# Patient Record
Sex: FEMALE | Race: WHITE | NOT HISPANIC OR LATINO | Employment: FULL TIME | URBAN - METROPOLITAN AREA
[De-identification: names, ages, dates, MRNs, and addresses within clinical notes are randomized per-mention and may not be internally consistent; named-entity substitution may affect disease eponyms.]

---

## 2024-06-14 ENCOUNTER — OFFICE VISIT (OUTPATIENT)
Dept: OBGYN CLINIC | Facility: CLINIC | Age: 44
End: 2024-06-14
Payer: COMMERCIAL

## 2024-06-14 VITALS
BODY MASS INDEX: 26.58 KG/M2 | HEIGHT: 63 IN | WEIGHT: 150 LBS | HEART RATE: 71 BPM | SYSTOLIC BLOOD PRESSURE: 108 MMHG | DIASTOLIC BLOOD PRESSURE: 76 MMHG
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
## 2024-06-14 PROCEDURE — 99213 OFFICE O/P EST LOW 20 MIN: CPT | Performed by: ORTHOPAEDIC SURGERY

## 2024-06-14 NOTE — PROGRESS NOTES
Assessment/Plan:  1. Repetitive stress injury            Aileen has a MRI demonstrating a grade 1 tibial stress injury.  This is consistent with her exam.  She continues to have pain on palpation of the proximal tibia but no pain with ambulation or walking.  I recommended continued conservative treatment and modified physical activity without running until her pain fully resolves upon palpation.  I would typically recommend about 4 to 6 weeks in the cam walker boot followed by 6 weeks of decreased physical activity and no running.  She will resume physical activity and exercise once the pain fully resolves.  She may follow-up with me as needed.      Subjective:   Aileen Romna is a 43 y.o. female who presents to the office for follow-up for left-sided leg pain.  She started running in August of last year and had increased pain in her left shin at her last visit in April.  I was clinically concerned for a possible stress injury.  She was placed in a cam walker boot and an MRI was ordered.  She initially did not schedule the MRI and simply use the boot for about 4 weeks.  She states the pain improved in the boot was bothering her so then she came out of the boot and resumed walking.  She states the pain is not as severe but she still has some discomfort when she presses on her tibia region.  She proceeded with the MRI and is now here for follow-up.  She has not been running.  She has no pain with walking.      Review of Systems   Constitutional:  Negative for chills, fever and unexpected weight change.   HENT:  Negative for hearing loss, nosebleeds and sore throat.    Eyes:  Negative for pain, redness and visual disturbance.   Respiratory:  Negative for cough, shortness of breath and wheezing.    Cardiovascular:  Negative for chest pain, palpitations and leg swelling.   Gastrointestinal:  Negative for abdominal pain, nausea and vomiting.   Endocrine: Negative for polydipsia and polyuria.   Genitourinary:  Negative  for dysuria and hematuria.   Musculoskeletal:         See HPI   Skin:  Negative for rash and wound.   Neurological:  Negative for dizziness, numbness and headaches.   Psychiatric/Behavioral:  Negative for decreased concentration and suicidal ideas. The patient is not nervous/anxious.          History reviewed. No pertinent past medical history.    Past Surgical History:   Procedure Laterality Date     SECTION             Family History   Family history unknown: Yes       Social History     Occupational History    Not on file   Tobacco Use    Smoking status: Never    Smokeless tobacco: Never   Vaping Use    Vaping status: Never Used   Substance and Sexual Activity    Alcohol use: Yes    Drug use: Never    Sexual activity: Not on file         Current Outpatient Medications:     doxycycline hyclate (VIBRA-TABS) 100 mg tablet, Take 100 mg by mouth 2 (two) times a day with meals (Patient not taking: Reported on 2024), Disp: , Rfl:     Allergies   Allergen Reactions    Amoxicillin Hives    Doxycycline Other (See Comments)     hands go numb       Objective:  Vitals:    24 0859   BP: 108/76   Pulse: 71     Pain Score:   1      Left Ankle Exam   Swelling: mild    Range of Motion   Dorsiflexion:  normal   Plantar flexion:  normal   Eversion:  normal   Inversion:  normal     Muscle Strength   Dorsiflexion:  5/5   Plantar flexion:  5/5   Anterior tibial:  5/5   Posterior tibial:  5/5  Gastrocsoleus:  5/5  Peroneal muscle:  5/5    Other   Erythema: absent  Sensation: normal  Pulse: present          Strength/Myotome Testing     Left Ankle/Foot   Dorsiflexion: 5  Plantar flexion: 5      Physical Exam  Vitals and nursing note reviewed.   Constitutional:       Appearance: Normal appearance. She is well-developed.   HENT:      Head: Normocephalic and atraumatic.      Right Ear: External ear normal.      Left Ear: External ear normal.   Eyes:      General: No scleral icterus.     Extraocular Movements:  Extraocular movements intact.      Conjunctiva/sclera: Conjunctivae normal.   Cardiovascular:      Rate and Rhythm: Normal rate.   Pulmonary:      Effort: Pulmonary effort is normal. No respiratory distress.   Musculoskeletal:      Cervical back: Normal range of motion and neck supple.      Comments: See Ortho exam   Skin:     General: Skin is warm and dry.   Neurological:      General: No focal deficit present.      Mental Status: She is alert and oriented to person, place, and time.   Psychiatric:         Behavior: Behavior normal.         I have personally reviewed pertinent films in PACS and my interpretation is as follows:  MRI demonstrates bone marrow edema over the patient's site of pain in the distal tibia consistent with grade 1 stress injury.      This document was created using speech voice recognition software.   Grammatical errors, random word insertions, pronoun errors, and incomplete sentences are an occasional consequence of this system due to software limitations, ambient noise, and hardware issues.   Any formal questions or concerns about content, text, or information contained within the body of this dictation should be directly addressed to the provider for clarification.